# Patient Record
(demographics unavailable — no encounter records)

---

## 2025-02-19 NOTE — PHYSICAL EXAM
[Restricted in physically strenuous activity but ambulatory and able to carry out work of a light or sedentary nature] : Status 1- Restricted in physically strenuous activity but ambulatory and able to carry out work of a light or sedentary nature, e.g., light house work, office work [Normal] : affect appropriate [de-identified] : ring finger deformtity b/l

## 2025-02-19 NOTE — PHYSICAL EXAM
[Restricted in physically strenuous activity but ambulatory and able to carry out work of a light or sedentary nature] : Status 1- Restricted in physically strenuous activity but ambulatory and able to carry out work of a light or sedentary nature, e.g., light house work, office work [Normal] : affect appropriate [de-identified] : ring finger deformtity b/l

## 2025-02-19 NOTE — ASSESSMENT
[FreeTextEntry1] : Elevated FLCR Urine Bence-Carlson, urine immunofixation negative BMP shows normal creatinine Discussed at length about the findings and differential diagnosis including light chain disease Plan Labs today- CBC, CMP, SPEP, IFx, FLCR, flow cytometry Bone marrow in 2-3 weeks. Send myeloma panel and congo red for amyloidosis PET/CT- He had PSMA PET (10/2024) which showed normal bones. Will decide based on findings on labs and bone marrow  Prostate cancer Diagnosed [phone] by Dr Phelps Managed by Dr Fernandez with MRIdian therapy x 5 session Subsequently has been on observation PSMA (2024): SHAHAB. Records not available at this time. Will try to obtain  Social Hx: Lives in [facility] with wife Smoker: former smoker: 20 years, stopped 10-15 years ago: 1 pack a week ETOH - social drinker Illicit drugs - none Work: , teaching. Now retired Genetics: high risk  Screenings: Colonoscopy: none Cologuard: 2025 Prostate;: 2024. Strongly encouraged to pursue colonoscopy    Patient had multiple questions which were answered to satisfaction  FOllow up in a few days for bone marrow CBC

## 2025-02-19 NOTE — ASSESSMENT
[FreeTextEntry1] : Elevated FLCR Urine Bence-Carlson, urine immunofixation negative BMP shows normal creatinine Discussed at length about the findings and differential diagnosis including light chain disease Plan Labs today- CBC, CMP, SPEP, IFx, FLCR, flow cytometry Bone marrow in 2-3 weeks. Send myeloma panel and congo red for amyloidosis PET/CT- He had PSMA PET (10/2024) which showed normal bones. Will decide based on findings on labs and bone marrow  Prostate cancer Diagnosed [id number] by Dr Phelps Managed by Dr Fernandez with MRIdian therapy x 5 session Subsequently has been on observation PSMA (2024): SHAHAB. Records not available at this time. 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 Strongly encouraged to pursue colonoscopy    Patient had multiple questions which were answered to satisfaction  FOllow up in a few days for bone marrow CBC

## 2025-02-19 NOTE — BEGINNING OF VISIT
[0] : 2) Feeling down, depressed, or hopeless: Not at all (0) [PHQ-2 Negative] : PHQ-2 Negative [Pain Scale: ___] : On a scale of 1-10, today the patient's pain is a(n) [unfilled]. [Former] : Former [10-14] : 10-14 [> 15 Years] : > 15 Years [Date Discussed (MM/DD/YY): ___] : Discussed: [unfilled] [Reviewed, no changes] : Reviewed, no changes

## 2025-02-19 NOTE — HISTORY OF PRESENT ILLNESS
[de-identified] : Mr. Raul Montes is 63 years old male with elevated Kappa chain here for consultation referred , cardiologist.   Patient with hx of cardiomyopathy , CAD, GERD, HTN, HLD, hypothyroid, JORDY, and prostate cancer [facility] therapy at [place]   He was a 9/11    10/2024  H/H 13.6/42.1 MCV 89.9  normal Calcium and Bun/Cr  1/31/2025 Kappa - 27.82 Lambda - 1.46  FLCR - 19.05  urine total protein - 20  urine bence ta - neg   He was tested for amyloidosis which he was reported was negative and states that he will possibly undergo a cardiac MRI.   10/1/2024 Pet PSMA 1. No tracer avid lymphadenopathy or distant disease. 2. Nonspecific mild tracer heterogeneity in the prostate.  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

## 2025-02-19 NOTE — PHYSICAL EXAM
[Restricted in physically strenuous activity but ambulatory and able to carry out work of a light or sedentary nature] : Status 1- Restricted in physically strenuous activity but ambulatory and able to carry out work of a light or sedentary nature, e.g., light house work, office work [Normal] : affect appropriate [de-identified] : ring finger deformtity b/l

## 2025-02-19 NOTE — PHYSICAL EXAM
[Restricted in physically strenuous activity but ambulatory and able to carry out work of a light or sedentary nature] : Status 1- Restricted in physically strenuous activity but ambulatory and able to carry out work of a light or sedentary nature, e.g., light house work, office work [Normal] : affect appropriate [de-identified] : ring finger deformtity b/l

## 2025-02-19 NOTE — ASSESSMENT
[FreeTextEntry1] : Elevated FLCR Urine Bence-Carlson, urine immunofixation negative BMP shows normal creatinine Discussed at length about the findings and differential diagnosis including light chain disease Plan Labs today- CBC, CMP, SPEP, IFx, FLCR, flow cytometry Bone marrow in 2-3 weeks. Send myeloma panel and congo red for amyloidosis PET/CT- He had PSMA PET (10/2024) which showed normal bones. Will decide based on findings on labs and bone marrow  Prostate cancer Diagnosed [id number] by Dr Phelps Managed by Dr Fernandez with MRIdian therapy x 5 session Subsequently has been on observation PSMA (2024): SHAHAB. Records not available at this time. 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 Strongly encouraged to pursue colonoscopy    Patient had multiple questions which were answered to satisfaction  FOllow up in a few days for bone marrow CBC

## 2025-02-19 NOTE — HISTORY OF PRESENT ILLNESS
[de-identified] : Mr. Raul Montes is 63 years old male with elevated Kappa chain here for consultation referred , cardiologist.   Patient with hx of cardiomyopathy , CAD, GERD, HTN, HLD, hypothyroid, JORDY, and prostate cancer Merd[facility] therapy at [place]   He was a 9/11    10/2024  H/H 13.6/42.1 MCV 89.9  normal Calcium and Bun/Cr  1/31/2025 Kappa - 27.82 Lambda - 1.46  FLCR - 19.05  urine total protein - 20  urine bence ta - neg   He was tested for amyloidosis which he was reported was negative and states that he will possibly undergo a cardiac MRI.   10/1/2024 Pet PSMA 1. No tracer avid lymphadenopathy or distant disease. 2. Nonspecific mild tracer heterogeneity in the prostate.  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

## 2025-02-19 NOTE — HISTORY OF PRESENT ILLNESS
[de-identified] : Mr. Raul Montes is 63 years old male with elevated Kappa chain here for consultation referred , cardiologist.   Patient with hx of cardiomyopathy , CAD, GERD, HTN, HLD, hypothyroid, JORDY, and prostate cancer Merdian therapy at [place]   He was a 9/11    10/2024  H/H 13.6/42.1 MCV 89.9  normal Calcium and Bun/Cr  1/31/2025 Kappa - 27.82 Lambda - 1.46  FLCR - 19.05  urine total protein - 20  urine bence ta - neg   He was tested for amyloidosis which he was reported was negative and states that he will possibly undergo a cardiac MRI.   10/1/2024 Pet PSMA 1. No tracer avid lymphadenopathy or distant disease. 2. Nonspecific mild tracer heterogeneity in the prostate.  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

## 2025-02-19 NOTE — ASSESSMENT
[FreeTextEntry1] : Elevated FLCR Urine Bence-Carlson, urine immunofixation negative BMP shows normal creatinine Discussed at length about the findings and differential diagnosis including light chain disease Plan Labs today- CBC, CMP, SPEP, IFx, FLCR, flow cytometry Bone marrow in 2-3 weeks. Send myeloma panel and congo red for amyloidosis PET/CT- He had PSMA PET (10/2024) which showed normal bones. Will decide based on findings on labs and bone marrow  Prostate cancer Diagnosed [id number] by Dr Phelps Managed by Dr Fernandez with MRIdian therapy x 5 session Subsequently has been on observation PSMA (2024): SHAHAB. Records not available at this time. 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 Strongly encouraged to pursue colonoscopy    Patient had multiple questions which were answered to satisfaction  FOllow up in a few days for bone marrow CBC

## 2025-02-22 NOTE — ASSESSMENT
[FreeTextEntry1] : 62 yo male with h/o CAD s/p PCI in 2011 (RCA/RPLA with non-obs disease in LAD/D1 and OM1), HTN, HLD, JORDY here for follow up.  #Dyspnea on exertion, recurrent chest pain requiring hospitalization 02/2025- h/o CAD s/p PCI - CCS III --will obtain CCTA given persistent chest pain requiring SL nitro --recent TTE/stress test WNL; although only 5 min of exercise  --remains on ASA and plavix - will continue for now --continue statin --continue current anti-anginal therapies including BB --continued CV risk factor modification discussed  #Abnormal EKG - low voltage with elevated kappa/meaghan ratio --TTR amyloid study negative --will obtain cardiac MRI to assess for cardiac involvement --continue heme/onc follow up and care   # Hypertension -  well controlled today - continue current anti-hypertensives inc coreg, losartan, hctz - home BP monitoring encouraged - continued exercise as tolerated - Low salt diet  # Hyperlipidemia -  Sub-optimally Controlled, Aim for a LDL of <70 - Continue current prescribed medications inc lipitor 80mg --labs pending today - Low fat low cholesterol diet - Heart healthy diet emphasized - Exercise as tolerated  Follow up after above workup

## 2025-02-22 NOTE — ASSESSMENT
[FreeTextEntry1] : 64 yo male with h/o CAD s/p PCI in 2011 (RCA/RPLA with non-obs disease in LAD/D1 and OM1), HTN, HLD, JORDY here for follow up.  #Dyspnea on exertion, recurrent chest pain requiring hospitalization 02/2025- h/o CAD s/p PCI - CCS III --will obtain CCTA given persistent chest pain requiring SL nitro --recent TTE/stress test WNL; although only 5 min of exercise  --remains on ASA and plavix - will continue for now --continue statin --continue current anti-anginal therapies including BB --continued CV risk factor modification discussed  #Abnormal EKG - low voltage with elevated kappa/meaghan ratio --TTR amyloid study negative --will obtain cardiac MRI to assess for cardiac involvement --continue heme/onc follow up and care   # Hypertension -  well controlled today - continue current anti-hypertensives inc coreg, losartan, hctz - home BP monitoring encouraged - continued exercise as tolerated - Low salt diet  # Hyperlipidemia -  Sub-optimally Controlled, Aim for a LDL of <70 - Continue current prescribed medications inc lipitor 80mg --labs pending today - Low fat low cholesterol diet - Heart healthy diet emphasized - Exercise as tolerated  Follow up after above workup

## 2025-02-22 NOTE — HISTORY OF PRESENT ILLNESS
[FreeTextEntry1] : 62 yo male with h/o CAD s/p PCI in 2011 (RCA/RPLA with non-obs disease in LAD/D1 and OM1), HTN, HLD, JORDY here for follow up cardiac care. Since last visit, he was hospitalized at [facility] 2/17/25 for chest pain in the setting of elevated BP. He took SL nitro which helped alleviate the discomfort. Did not have symptoms prior to his PCI. His shortness of breath has resolved since last visit. Denies any palpitations, orthopnea, PND or LE edema.  In the interim, he is also undergoing amyloid workup for low voltage EKG/LVH on echo - found to have elevated kappa/lambda ratio and is now following with heme-onc for possible paraproteinemia. Has a BM biopsy pending  Cardiac Workup: EKG: NSR with inferior infarct and low voltage precordial leads; unchanged from prior in 02/2024 Stress test 2013: 9 min 31 sec (11 METs), no ischemia by EKG or echo. delayed HR recovery. TTE 01/2025: LVEF 60%, diastolic dysfunction, LVH, borderline pulm HTN, no sign valvular disease Stress EKG 01/2025: exercise time 5 min 36 sec (7.1 METS); 106% MPHR achieved PYP scan 01/2025: neg for TTR amyloid kappa/lambda ratio 19

## 2025-02-22 NOTE — HISTORY OF PRESENT ILLNESS
[FreeTextEntry1] : 64 yo male with h/o CAD s/p PCI in 2011 (RCA/RPLA with non-obs disease in LAD/D1 and OM1), HTN, HLD, JORDY here for follow up cardiac care. Since last visit, he was hospitalized at [facility] 2/17/25 for chest pain in the setting of elevated BP. He took SL nitro which helped alleviate the discomfort. Did not have symptoms prior to his PCI. His shortness of breath has resolved since last visit. Denies any palpitations, orthopnea, PND or LE edema.  In the interim, he is also undergoing amyloid workup for low voltage EKG/LVH on echo - found to have elevated kappa/lambda ratio and is now following with heme-onc for possible paraproteinemia. Has a BM biopsy pending  Cardiac Workup: EKG: NSR with inferior infarct and low voltage precordial leads; unchanged from prior in 02/2024 Stress test 2013: 9 min 31 sec (11 METs), no ischemia by EKG or echo. delayed HR recovery. TTE 01/2025: LVEF 60%, diastolic dysfunction, LVH, borderline pulm HTN, no sign valvular disease Stress EKG 01/2025: exercise time 5 min 36 sec (7.1 METS); 106% MPHR achieved PYP scan 01/2025: neg for TTR amyloid kappa/lambda ratio 19

## 2025-03-07 NOTE — PROCEDURE
[Bone Marrow Biopsy] : bone marrow biopsy [Bone Marrow Aspiration] : bone marrow aspiration  [Patient] : the patient [Patient identification verified] : patient identification verified [Procedure verified and consent obtained] : procedure verified and consent obtained [Laterality verified and correct site marked] : laterality verified and correct site marked [Left] : site: left [Correct positioning] : correct positioning [Prone] : prone [Superior iliac spine was identified] : the superior iliac spine was identified. [The left posterior iliac crest was prepped with betadine and draped, using sterile technique.] : The left posterior iliac crest was prepped with betadine and draped, using sterile technique. [Lidocaine was injected and into the periosteum overlying the site.] : Lidocaine was injected and into the periosteum overlying the site. [Aspirate] : aspirate [Cytogenetics] : cytogenetics [FISH] : FISH [Other ___] : [unfilled] [Biopsy] : biopsy [Flow Cytometry] : flow cytometry [] : The patient was instructed to remove the bandage the following AM. The patient may bathe. Acetaminophen may be taken for discomfort, as per package directions.If there are any other problems, the patient was instructed to call the office. The patient verbalized understanding, and is aware of the office contact numbers. [FreeTextEntry1] : Elevated free light chains rule out myeloma, amyloidosis

## 2025-03-07 NOTE — ASSESSMENT
[FreeTextEntry1] : Elevated FLCR Urine Bence-Carlson, urine immunofixation negative BMP shows normal creatinine Labs reviewed analysed and discussed Bone marrow today- tolerated well Plan PET/CT- He had PSMA PET (10/2024) which showed normal bones. Will decide on FDG PET or MRI spine based on findings on labs and bone marrow  Prostate cancer Diagnosed [id number] by Dr Phelps Managed by Dr Fernandez with MRIdian therapy x 5 session Subsequently has been on observation PSMA (2024): SHAHAB. Records not available at this time. 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 Strongly encouraged to pursue colonoscopy    Patient and his wife had multiple questions which were answered to satisfaction  Follow up in 3 weeks  No labs

## 2025-03-07 NOTE — HISTORY OF PRESENT ILLNESS
[de-identified] : Mr. Raul Montes is 63 years old male with elevated Kappa chain here for consultation referred , cardiologist.   Patient with hx of cardiomyopathy , CAD, GERD, HTN, HLD, hypothyroid, JORDY, and prostate cancer Merdian therapy at [place]   He was a 9/11    10/2024  H/H 13.6/42.1 MCV 89.9  normal Calcium and Bun/Cr  1/31/2025 Kappa - 27.82 Lambda - 1.46  FLCR - 19.05  urine total protein - 20  urine bence ta - neg   He was tested for amyloidosis which he was reported was negative and states that he will possibly undergo a cardiac MRI.   10/1/2024 Pet PSMA 1. No tracer avid lymphadenopathy or distant disease. 2. Nonspecific mild tracer heterogeneity in the prostate.  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

## 2025-03-07 NOTE — PHYSICAL EXAM
[Restricted in physically strenuous activity but ambulatory and able to carry out work of a light or sedentary nature] : Status 1- Restricted in physically strenuous activity but ambulatory and able to carry out work of a light or sedentary nature, e.g., light house work, office work [Normal] : affect appropriate [de-identified] : ring finger deformtity b/l 4 = No assist / stand by assistance

## 2025-03-31 NOTE — ASSESSMENT
[FreeTextEntry1] : 62 yo male with h/o CAD s/p PCI in 2011 (RCA/RPLA with non-obs disease in LAD/D1 and OM1), HTN, HLD, JORDY here for follow up.  #Dyspnea on exertion, recurrent chest pain requiring hospitalization 02/2025- h/o CAD s/p PCI - CCS III --CCTA with concern for severe stenosis in the pLAD with noncalcific and calcific plaque, RCA stent present w/ probable sig ISR --recent TTE/stress test WNL; although only 5 min of exercise  --pending cardiac cath scheduled for this Friday 2/28/25 w/ Dr. Hylton  --remains on ASA and Plavix - will continue --continue statin --continue current anti-anginal therapies including BB --continued CV risk factor modification discussed  #Abnormal EKG - low voltage with elevated kappa/meaghan ratio --TTR amyloid study negative --will obtain cardiac MRI to assess for cardiac involvement --continue heme/onc follow up and care  #Hypertension  - continue current anti-hypertensives inc coreg, losartan, hctz - home BP monitoring encouraged - continued exercise as tolerated - Low salt diet  # Hyperlipidemia - LDL 67; Aim for an LDL of <70 --Continue current prescribed medications inc lipitor 80mg --labs pending today --Low fat low cholesterol diet --Heart healthy diet emphasized --Exercise as tolerated  Follow up with Dr. Hylton on 4/10/25

## 2025-03-31 NOTE — HISTORY OF PRESENT ILLNESS
[FreeTextEntry1] : 64 yo male with h/o CAD s/p PCI in 2011 (RCA/RPLA with non-obs disease in LAD/D1 and OM1), HTN, HLD, JORDY here for follow up cardiac care. Since last visit, he was hospitalized at [facility] 2/17/25 for chest pain in the setting of elevated BP. He took SL nitro which helped alleviate the discomfort. Did not have symptoms prior to his PCI. His shortness of breath has resolved since last visit. Denies any palpitations, orthopnea, PND or LE edema.  CCTA with concern for severe stenosis in the prox LAD and RCA stent present w/ probable sig ISR Pending cardiac cath with Dr. Hylton this Friday 3/28/25. Cath labs done today. Discussed CCTA results in detail.  In the interim, he is also undergoing amyloid workup for low voltage EKG/LVH on echo - found to have elevated kappa/lambda ratio and is now following with heme-onc for possible paraproteinemia. Has a BM biopsy pending  Cardiac Workup: EKG: NSR with inferior infarct and low voltage precordial leads; unchanged from prior in 02/2024 Stress test 2013: 9 min 31 sec (11 METs), no ischemia by EKG or echo. delayed HR recovery. TTE 01/2025: LVEF 60%, diastolic dysfunction, LVH, borderline pulm HTN, no sign valvular disease Stress EKG 01/2025: exercise time 5 min 36 sec (7.1 METS); 106% MPHR achieved PYP scan 01/2025: neg for TTR amyloid kappa/lambda ratio 19 CCTA 3/20/25: severe stenosis prox LAD noncalcific and calcific plaque, RCA stent present w/ probable sig ISR cardiac MRI 3/2025: concentric LVH. normal LV function.

## 2025-03-31 NOTE — PHYSICAL EXAM
[Well Developed] : well developed [Well Nourished] : well nourished [No Acute Distress] : no acute distress [No Respiratory Distress] : no respiratory distress  [Normal Gait] : normal gait [No Edema] : no edema [No Cyanosis] : no cyanosis [No Clubbing] : no clubbing [No Varicosities] : no varicosities [No Rash] : no rash [No Skin Lesions] : no skin lesions [Moves all extremities] : moves all extremities [No Focal Deficits] : no focal deficits [Normal Speech] : normal speech [Alert and Oriented] : alert and oriented [Normal memory] : normal memory

## 2025-04-14 NOTE — ASSESSMENT
[FreeTextEntry1] : will need f/u yktn  and staged PCI on April 25th   62 yo male with h/o CAD s/p PCI in 2011 (RCA/RPLA with non-obs disease in LAD/D1 and OM1), HTN, HLD, JORDY here for follow up.  #Dyspnea on exertion, recurrent chest pain requiring hospitalization 02/2025- h/o CAD s/p PCI - CCS III --CCTA with concern for severe stenosis in the pLAD with noncalcific and calcific plaque, RCA stent present w/ probable sig ISR --recent TTE/stress test WNL; although only 5 min of exercise  --pending cardiac cath scheduled for this Friday 2/28/25 w/ Dr. Hylton  --remains on ASA and Plavix - will continue --continue statin --continue current anti-anginal therapies including BB --continued CV risk factor modification discussed  #Abnormal EKG - low voltage with elevated kappa/meaghan ratio --TTR amyloid study negative --will obtain cardiac MRI to assess for cardiac involvement --continue heme/onc follow up and care  #Hypertension  - continue current anti-hypertensives inc coreg, losartan, hctz - home BP monitoring encouraged - continued exercise as tolerated - Low salt diet  # Hyperlipidemia - LDL 67; Aim for an LDL of <70 --Continue current prescribed medications inc lipitor 80mg --labs pending today --Low fat low cholesterol diet --Heart healthy diet emphasized --Exercise as tolerated  Follow up with Dr. Hylton on 4/10/25

## 2025-04-14 NOTE — HISTORY OF PRESENT ILLNESS
[FreeTextEntry1] : 62 yo male with h/o CAD s/p PCI in 2011 (RCA/RPLA with non-obs disease in LAD/D1 and OM1), HTN, HLD, JORDY, MGUS here for follow up cardiac care. Since last visit, he was hospitalized at [facility] 2/17/25 for chest pain in the setting of elevated BP. He took SL nitro which helped alleviate the discomfort. Subsequent CCTA significant for two vessel obstructive disease - RCA ISR and severe prox LAD disease. He underwent PCI to the RCA on 3/28/25 with re-RASHEED. He has residual LAD stenosis with plan for staged PCI.  He returns for follow up. Reports brief episodes of dyspnea that resolve - more pronounced soon after the procedure; now improving. Had HTN episode 2 days ago when he felt "inflammed" - notes chest discomfort at the time with associated dyspnea that resolved with deep breathing and rest after a few minutes. Has not recurred. He has not been exerting himself. 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 No heavy lifting or strenuous exertion Denies any significant palpitations, orthopnea, LE edema, PND or syncope.  Cardiac Workup: EKG: NSR with inferior infarct and low voltage precordial leads; unchanged from prior in 02/2024 Stress test 2013: 9 min 31 sec (11 METs), no ischemia by EKG or echo. delayed HR recovery. TTE 01/2025: LVEF 60%, diastolic dysfunction, LVH, borderline pulm HTN, no sign valvular disease Stress EKG 01/2025: exercise time 5 min 36 sec (7.1 METS); 106% MPHR achieved PYP scan 01/2025: neg for TTR amyloid kappa/lambda ratio 19 CCTA 3/20/25: severe stenosis prox LAD noncalcific and calcific plaque, RCA stent present w/ probable sig ISR cardiac MRI 3/2025: concentric LVH. normal LV function. Cardiac cath 
[FreeTextEntry1] : 62 yo male with h/o CAD s/p PCI in 2011 (RCA/RPLA with non-obs disease in LAD/D1 and OM1), HTN, HLD, JORDY, MGUS here for follow up cardiac care. Since last visit, he was hospitalized at [facility] 2/17/25 for chest pain in the setting of elevated BP. He took SL nitro which helped alleviate the discomfort. Subsequent CCTA significant for two vessel obstructive disease - RCA ISR and severe prox LAD disease. He underwent PCI to the RCA on 3/28/25 with re-RASHEED. He has residual LAD stenosis with plan for staged PCI.  He returns for follow up. Reports brief episodes of dyspnea that resolve - more pronounced soon after the procedure; now improving. Had HTN episode 2 days ago when he felt "inflammed" - notes chest discomfort at the time with associated dyspnea that resolved with deep breathing and rest after a few minutes. Has not recurred. He has not been exerting himself. 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 No heavy lifting or strenuous exertion Denies any significant palpitations, orthopnea, LE edema, PND or syncope.  Cardiac Workup: EKG: NSR with inferior infarct and low voltage precordial leads; unchanged from prior in 02/2024 Stress test 2013: 9 min 31 sec (11 METs), no ischemia by EKG or echo. delayed HR recovery. TTE 01/2025: LVEF 60%, diastolic dysfunction, LVH, borderline pulm HTN, no sign valvular disease Stress EKG 01/2025: exercise time 5 min 36 sec (7.1 METS); 106% MPHR achieved PYP scan 01/2025: neg for TTR amyloid kappa/lambda ratio 19 CCTA 3/20/25: severe stenosis prox LAD noncalcific and calcific plaque, RCA stent present w/ probable sig ISR cardiac MRI 3/2025: concentric LVH. normal LV function. Cardiac cath 
[FreeTextEntry1] : 62 yo male with h/o CAD s/p PCI in 2011 (RCA/RPLA with non-obs disease in LAD/D1 and OM1), HTN, HLD, JORDY, MGUS here for follow up cardiac care. Since last visit, he was hospitalized at [facility] 2/17/25 for chest pain in the setting of elevated BP. He took SL nitro which helped alleviate the discomfort. Subsequent CCTA significant for two vessel obstructive disease - RCA ISR and severe prox LAD disease. He underwent PCI to the RCA on 3/28/25 with re-RASHEED. He has residual LAD stenosis with plan for staged PCI.  He returns for follow up. Reports brief episodes of dyspnea that resolve - more pronounced soon after the procedure; now improving. Had HTN episode 2 days ago when he felt "inflammed" - notes chest discomfort at the time with associated dyspnea that resolved with deep breathing and rest after a few minutes. Has not recurred. He has not been exerting himself. 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 No heavy lifting or strenuous exertion Denies any significant palpitations, orthopnea, LE edema, PND or syncope.  Cardiac Workup: EKG: NSR with inferior infarct and low voltage precordial leads; unchanged from prior in 02/2024 Stress test 2013: 9 min 31 sec (11 METs), no ischemia by EKG or echo. delayed HR recovery. TTE 01/2025: LVEF 60%, diastolic dysfunction, LVH, borderline pulm HTN, no sign valvular disease Stress EKG 01/2025: exercise time 5 min 36 sec (7.1 METS); 106% MPHR achieved PYP scan 01/2025: neg for TTR amyloid kappa/lambda ratio 19 CCTA 3/20/25: severe stenosis prox LAD noncalcific and calcific plaque, RCA stent present w/ probable sig ISR cardiac MRI 3/2025: concentric LVH. normal LV function. Cardiac cath 
[FreeTextEntry1] : 64 yo male with h/o CAD s/p PCI in 2011 (RCA/RPLA with non-obs disease in LAD/D1 and OM1), HTN, HLD, JORDY, MGUS here for follow up cardiac care. Since last visit, he was hospitalized at [facility] 2/17/25 for chest pain in the setting of elevated BP. He took SL nitro which helped alleviate the discomfort. Subsequent CCTA significant for two vessel obstructive disease - RCA ISR and severe prox LAD disease. He underwent PCI to the RCA on 3/28/25 with re-RASHEED. He has residual LAD stenosis with plan for staged PCI.  He returns for follow up. Reports brief episodes of dyspnea that resolve - more pronounced soon after the procedure; now improving. Had HTN episode 2 days ago when he felt "inflammed" - notes chest discomfort at the time with associated dyspnea that resolved with deep breathing and rest after a few minutes. Has not recurred. He has not been exerting himself. 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 No heavy lifting or strenuous exertion Denies any significant palpitations, orthopnea, LE edema, PND or syncope.  Cardiac Workup: EKG: NSR with inferior infarct and low voltage precordial leads; unchanged from prior in 02/2024 Stress test 2013: 9 min 31 sec (11 METs), no ischemia by EKG or echo. delayed HR recovery. TTE 01/2025: LVEF 60%, diastolic dysfunction, LVH, borderline pulm HTN, no sign valvular disease Stress EKG 01/2025: exercise time 5 min 36 sec (7.1 METS); 106% MPHR achieved PYP scan 01/2025: neg for TTR amyloid kappa/lambda ratio 19 CCTA 3/20/25: severe stenosis prox LAD noncalcific and calcific plaque, RCA stent present w/ probable sig ISR cardiac MRI 3/2025: concentric LVH. normal LV function. Cardiac cath 
<<----- Click to add NO significant Past Surgical History

## 2025-04-14 NOTE — ASSESSMENT
[FreeTextEntry1] : will need f/u yktn  and staged PCI on April 25th   64 yo male with h/o CAD s/p PCI in 2011 (RCA/RPLA with non-obs disease in LAD/D1 and OM1), HTN, HLD, JORDY here for follow up.  #Dyspnea on exertion, recurrent chest pain requiring hospitalization 02/2025- h/o CAD s/p PCI - CCS III --CCTA with concern for severe stenosis in the pLAD with noncalcific and calcific plaque, RCA stent present w/ probable sig ISR --recent TTE/stress test WNL; although only 5 min of exercise  --pending cardiac cath scheduled for this Friday 2/28/25 w/ Dr. Hylton  --remains on ASA and Plavix - will continue --continue statin --continue current anti-anginal therapies including BB --continued CV risk factor modification discussed  #Abnormal EKG - low voltage with elevated kappa/meaghan ratio --TTR amyloid study negative --will obtain cardiac MRI to assess for cardiac involvement --continue heme/onc follow up and care  #Hypertension  - continue current anti-hypertensives inc coreg, losartan, hctz - home BP monitoring encouraged - continued exercise as tolerated - Low salt diet  # Hyperlipidemia - LDL 67; Aim for an LDL of <70 --Continue current prescribed medications inc lipitor 80mg --labs pending today --Low fat low cholesterol diet --Heart healthy diet emphasized --Exercise as tolerated  Follow up with Dr. Hylton on 4/10/25

## 2025-05-05 NOTE — PHYSICAL EXAM
[Well Developed] : well developed [Well Nourished] : well nourished [No Acute Distress] : no acute distress [Normal Conjunctiva] : normal conjunctiva [Normal Venous Pressure] : normal venous pressure [No Carotid Bruit] : no carotid bruit [Normal S1, S2] : normal S1, S2 [No Murmur] : no murmur [No Rub] : no rub [No Respiratory Distress] : no respiratory distress  [Normal Gait] : normal gait [No Edema] : no edema [No Cyanosis] : no cyanosis [No Clubbing] : no clubbing [No Varicosities] : no varicosities [No Rash] : no rash [No Skin Lesions] : no skin lesions [Moves all extremities] : moves all extremities [No Focal Deficits] : no focal deficits [Normal Speech] : normal speech [Alert and Oriented] : alert and oriented [Normal memory] : normal memory

## 2025-05-17 NOTE — ASSESSMENT
[FreeTextEntry1] : 62 yo male with h/o CAD s/p PCI in 2011 (RCA/RPLA with non-obs disease in LAD/D1 and OM1), HTN, HLD, JORDY here for follow up.  #ASCVD - CAD s/p PCI of the RCA, LAD - last 5/2/25 --continue DAPT with ASA +ticagrelor --continue high intensity statin --continue BB --continued CV risk factor modification discussed --will benefit from cardiac rehab - post PCI   #Abnormal EKG - low voltage with elevated kappa/meaghan ratio --PYP scan negative for TTR amyloid --MRI - normal LVEF with severe concentric hypertrophy (1.6mm) --to consider genetic testing --continue heme/onc follow up and care for underlying MGUS  #Hypertension - well controlled today - continue current anti-hypertensives inc coreg, losartan, hctz - home BP monitoring encouraged - continued exercise as tolerated - Low salt diet  # Hyperlipidemia - LDL 67; Aim for an LDL of <70 --Continue current prescribed medications inc lipitor 80mg --Low fat low cholesterol diet --Heart healthy diet emphasized --Exercise as tolerated  Follow up in 3 months

## 2025-05-17 NOTE — ASSESSMENT
[FreeTextEntry1] : 64 yo male with h/o CAD s/p PCI in 2011 (RCA/RPLA with non-obs disease in LAD/D1 and OM1), HTN, HLD, JORDY here for follow up.  #ASCVD - CAD s/p PCI of the RCA, LAD - last 5/2/25 --continue DAPT with ASA +ticagrelor --continue high intensity statin --continue BB --continued CV risk factor modification discussed --will benefit from cardiac rehab - post PCI   #Abnormal EKG - low voltage with elevated kappa/meaghan ratio --PYP scan negative for TTR amyloid --MRI - normal LVEF with severe concentric hypertrophy (1.6mm) --to consider genetic testing --continue heme/onc follow up and care for underlying MGUS  #Hypertension - well controlled today - continue current anti-hypertensives inc coreg, losartan, hctz - home BP monitoring encouraged - continued exercise as tolerated - Low salt diet  # Hyperlipidemia - LDL 67; Aim for an LDL of <70 --Continue current prescribed medications inc lipitor 80mg --Low fat low cholesterol diet --Heart healthy diet emphasized --Exercise as tolerated  Follow up in 3 months

## 2025-05-17 NOTE — HISTORY OF PRESENT ILLNESS
[FreeTextEntry1] : 62 yo male with h/o CAD s/p PCI in 2011 (RCA/RPLA with non-obs disease in LAD/D1 and OM1), HTN, HLD, JORDY, MGUS here for follow up cardiac care. He was hospitalized at [facility] 2/17/25 for chest pain in the setting of elevated BP. He took SL nitro which helped alleviate the discomfort. Subsequent CCTA significant for two vessel obstructive disease - RCA ISR and severe prox LAD disease. He underwent PCI to the RCA on 3/28/25 with re-RASHEED. He had residual LAD stenosis and is s/p PCI using RASHEED x 3 to the prox LAD on 5/2/25. Post procedure had chest discomfort while admitted.  Today denies chest pain, palpitations, dyspnea, LE edema, PND or syncope. States that dyspnea on exertion is improving    Cardiac Workup: EKG: NSR with inferior infarct and low voltage precordial leads; unchanged from prior in 02/2024 Stress test 2013: 9 min 31 sec (11 METs), no ischemia by EKG or echo. delayed HR recovery. TTE 01/2025: LVEF 60%, diastolic dysfunction, LVH, borderline pulm HTN, no sign valvular disease Stress EKG 01/2025: exercise time 5 min 36 sec (7.1 METS); 106% MPHR achieved PYP scan 01/2025: neg for TTR amyloid kappa/lambda ratio 19 CCTA 3/20/25: severe stenosis prox LAD noncalcific and calcific plaque, RCA stent present w/ probable sig ISR cardiac MRI 3/2025: concentric LVH. normal LV+RV function. Cardiac cath 3/28/25: Obstructive two-vessel CAD in the proximal RCA (severe ISR 99% stenosis) and proximal LAD (90% diffuse stenosis).  LVEDP 14.  Successful PTCA, shockwave lithotripsy and RASHEED x 1 to the proximal RCA (3.25X 33 mm).  Plan for staged PCI of the LAD in 4 to 5 weeks.  Cardiac cath 5/2/25: Obstructive (90%) stenosis of the prox LAD s/p PTCA and DESx3 (3.5x28, 3.5x18 and 4.0x12mm) with excellent result. LVEDP 9 LM: mild disease; LAD: 90-95% prox LAD stenosis; 60% mid LAD; distal LAD is large without sig obstruc; LCX: mild diffuse OM1; small size; RCA: patent stent prox RCA; mild diffuse disease in the distal RCA 
[FreeTextEntry1] : 64 yo male with h/o CAD s/p PCI in 2011 (RCA/RPLA with non-obs disease in LAD/D1 and OM1), HTN, HLD, JORDY, MGUS here for follow up cardiac care. He was hospitalized at [facility] 2/17/25 for chest pain in the setting of elevated BP. He took SL nitro which helped alleviate the discomfort. Subsequent CCTA significant for two vessel obstructive disease - RCA ISR and severe prox LAD disease. He underwent PCI to the RCA on 3/28/25 with re-RASHEED. He had residual LAD stenosis and is s/p PCI using RASHEED x 3 to the prox LAD on 5/2/25. Post procedure had chest discomfort while admitted.  Today denies chest pain, palpitations, dyspnea, LE edema, PND or syncope. States that dyspnea on exertion is improving    Cardiac Workup: EKG: NSR with inferior infarct and low voltage precordial leads; unchanged from prior in 02/2024 Stress test 2013: 9 min 31 sec (11 METs), no ischemia by EKG or echo. delayed HR recovery. TTE 01/2025: LVEF 60%, diastolic dysfunction, LVH, borderline pulm HTN, no sign valvular disease Stress EKG 01/2025: exercise time 5 min 36 sec (7.1 METS); 106% MPHR achieved PYP scan 01/2025: neg for TTR amyloid kappa/lambda ratio 19 CCTA 3/20/25: severe stenosis prox LAD noncalcific and calcific plaque, RCA stent present w/ probable sig ISR cardiac MRI 3/2025: concentric LVH. normal LV+RV function. Cardiac cath 3/28/25: Obstructive two-vessel CAD in the proximal RCA (severe ISR 99% stenosis) and proximal LAD (90% diffuse stenosis).  LVEDP 14.  Successful PTCA, shockwave lithotripsy and RASHEED x 1 to the proximal RCA (3.25X 33 mm).  Plan for staged PCI of the LAD in 4 to 5 weeks.  Cardiac cath 5/2/25: Obstructive (90%) stenosis of the prox LAD s/p PTCA and DESx3 (3.5x28, 3.5x18 and 4.0x12mm) with excellent result. LVEDP 9 LM: mild disease; LAD: 90-95% prox LAD stenosis; 60% mid LAD; distal LAD is large without sig obstruc; LCX: mild diffuse OM1; small size; RCA: patent stent prox RCA; mild diffuse disease in the distal RCA 
Statement Selected

## 2025-07-02 NOTE — BEGINNING OF VISIT
[0] : 2) Feeling down, depressed, or hopeless: Not at all (0) [PHQ-2 Negative] : PHQ-2 Negative [Former] : Former [> 15 Years] : > 15 Years [Date Discussed (MM/DD/YY): ___] : Discussed: [unfilled] [With Patient/Caregiver] : with Patient/Caregiver [Abdominal Pain] : no abdominal pain [Vomiting] : no vomiting [Constipation] : no constipation

## 2025-07-02 NOTE — ASSESSMENT
[FreeTextEntry1] : ## IgA Kappa  Elevated FLCR Urine Bence-Carlson, urine immunofixation negative BMP shows normal creatinine 3/7/2028 Bone marrow - normal FISH and NGS A,B C. BONE MARROW BIOPSY, CLOT AND ASPIRATE: - Normocellular marrow involved with monotypic plasma cell population (5-6%) - Negative for lymphoma - Congo red is negative for amyloid deposition - Background unremarkable trilineage hematopoiesis  patient is here for follow up  clinically doing well -Labs are drawn in the office, reviewed, analyzed, and discussed normocytic anemia with normal renal function, calcium, and ferritin  Reassured patient that given his normal bone marrow and labs, we would just have to monitor labs from now and his anemia most likely reactive from his other medical conditions/inflammatory  Prostate cancer Diagnosed [id number] by Dr Phelps Managed by Dr Fernandez with MRIdian therapy x 5 session Subsequently has been on observation PSMA (2024): SHAHAB. Records not available at this time. Will try to obtain normal PSA   Social Hx: Lives in [place] with wife Smoker: former smoker: 20 years, stopped 10-15 years ago: 1 pack a week ETOH - social drinker Illicit drugs - none Work: , teaching. Now retired Genetics: high risk  Screenings: Colonoscopy: none Cologuard: 2025 Prostate;: 2024. Strongly encouraged to pursue colonoscopy    Patient and his wife had multiple questions which were answered to satisfaction  d/w Dr. Winters rtc in 6 months  CBC, CMP, Ferritin, PSA, and multiple myeloma

## 2025-07-02 NOTE — ASSESSMENT
[FreeTextEntry1] : ## IgA Kappa  Elevated FLCR Urine Bence-Carlson, urine immunofixation negative BMP shows normal creatinine 3/7/2028 Bone marrow - normal FISH and NGS A,B C. BONE MARROW BIOPSY, CLOT AND ASPIRATE: - Normocellular marrow involved with monotypic plasma cell population (5-6%) - Negative for lymphoma - Congo red is negative for amyloid deposition - Background unremarkable trilineage hematopoiesis  patient is here for follow up  clinically doing well -Labs are drawn in the office, reviewed, analyzed, and discussed normocytic anemia with normal renal function, calcium, and ferritin  Reassured patient that given his normal bone marrow and labs, we would just have to monitor labs from now and his anemia most likely reactive from his other medical conditions/inflammatory  Prostate cancer Diagnosed [id number] by Dr Phelps Managed by Dr Fernandez with MRIdian therapy x 5 session Subsequently has been on observation PSMA (2024): SHAHAB. Records not available at this time. Will try to obtain normal PSA   Social Hx: Lives in [place] 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 Strongly encouraged to pursue colonoscopy    Patient and his wife had multiple questions which were answered to satisfaction  d/w Dr. Winters rtc in 6 months  CBC, CMP, Ferritin, PSA, and multiple myeloma

## 2025-07-02 NOTE — REVIEW OF SYSTEMS
[Chest Pain] : chest pain [Negative] : Allergic/Immunologic [FreeTextEntry5] : intermittent  [FreeTextEntry2] : 10 point review of systems negative except as outlined in HPI

## 2025-07-02 NOTE — PHYSICAL EXAM
[Restricted in physically strenuous activity but ambulatory and able to carry out work of a light or sedentary nature] : Status 1- Restricted in physically strenuous activity but ambulatory and able to carry out work of a light or sedentary nature, e.g., light house work, office work [Normal] : affect appropriate [de-identified] : ring finger deformtity b/l

## 2025-07-02 NOTE — HISTORY OF PRESENT ILLNESS
[de-identified] : Mr. Raul Montes is 63 years old male with elevated Kappa chain here for consultation referred , cardiologist.   Patient with hx of cardiomyopathy , CAD, GERD, HTN, HLD, hypothyroid, JORDY, and prostate cancer (2022  s/p radiation and on Merdian therapy at [place]) He was a 9/11    10/2024  H/H 13.6/42.1 MCV 89.9  normal Calcium and Bun/Cr  1/31/2025 Kappa - 27.82 Lambda - 1.46  FLCR - 19.05  urine total protein - 20  urine bence ta - neg   He was tested for amyloidosis which he was reported was negative and states that he will possibly undergo a cardiac MRI.   10/1/2024 Pet PSMA 1. No tracer avid lymphadenopathy or distant disease. 2. Nonspecific mild tracer heterogeneity in the prostate.  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  01-Aug-2023

## 2025-07-02 NOTE — PHYSICAL EXAM
[Restricted in physically strenuous activity but ambulatory and able to carry out work of a light or sedentary nature] : Status 1- Restricted in physically strenuous activity but ambulatory and able to carry out work of a light or sedentary nature, e.g., light house work, office work [Normal] : affect appropriate [de-identified] : ring finger deformtity b/l

## 2025-07-02 NOTE — PHYSICAL EXAM
[Restricted in physically strenuous activity but ambulatory and able to carry out work of a light or sedentary nature] : Status 1- Restricted in physically strenuous activity but ambulatory and able to carry out work of a light or sedentary nature, e.g., light house work, office work [Normal] : affect appropriate [de-identified] : ring finger deformtity b/l

## 2025-07-02 NOTE — PHYSICAL EXAM
[Restricted in physically strenuous activity but ambulatory and able to carry out work of a light or sedentary nature] : Status 1- Restricted in physically strenuous activity but ambulatory and able to carry out work of a light or sedentary nature, e.g., light house work, office work [Normal] : affect appropriate [de-identified] : ring finger deformtity b/l

## 2025-07-02 NOTE — HISTORY OF PRESENT ILLNESS
[de-identified] : Mr. Raul Montes is 63 years old male with elevated Kappa chain here for consultation referred , cardiologist.   Patient with hx of cardiomyopathy , CAD, GERD, HTN, HLD, hypothyroid, JORDY, and prostate cancer (2022  s/p radiation and on Merdian therapy at [place]) He was a 9/11    10/2024  H/H 13.6/42.1 MCV 89.9  normal Calcium and Bun/Cr  1/31/2025 Kappa - 27.82 Lambda - 1.46  FLCR - 19.05  urine total protein - 20  urine bence ta - neg   He was tested for amyloidosis which he was reported was negative and states that he will possibly undergo a cardiac MRI.   10/1/2024 Pet PSMA 1. No tracer avid lymphadenopathy or distant disease. 2. Nonspecific mild tracer heterogeneity in the prostate.  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

## 2025-07-02 NOTE — PHYSICAL EXAM
[Restricted in physically strenuous activity but ambulatory and able to carry out work of a light or sedentary nature] : Status 1- Restricted in physically strenuous activity but ambulatory and able to carry out work of a light or sedentary nature, e.g., light house work, office work [Normal] : affect appropriate [de-identified] : ring finger deformtity b/l

## 2025-07-02 NOTE — HISTORY OF PRESENT ILLNESS
[de-identified] : Mr. Raul Montes is 63 years old male with elevated Kappa chain here for consultation referred , cardiologist.   Patient with hx of cardiomyopathy , CAD, GERD, HTN, HLD, hypothyroid, JORDY, and prostate cancer (2022  s/p radiation and on Merdian therapy at [place]) He was a 9/11    10/2024  H/H 13.6/42.1 MCV 89.9  normal Calcium and Bun/Cr  1/31/2025 Kappa - 27.82 Lambda - 1.46  FLCR - 19.05  urine total protein - 20  urine bence ta - neg   He was tested for amyloidosis which he was reported was negative and states that he will possibly undergo a cardiac MRI.   10/1/2024 Pet PSMA 1. No tracer avid lymphadenopathy or distant disease. 2. Nonspecific mild tracer heterogeneity in the prostate.  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

## 2025-07-02 NOTE — ASSESSMENT
[FreeTextEntry1] : ## IgA Kappa  Elevated FLCR Urine Bence-Carlson, urine immunofixation negative BMP shows normal creatinine 3/7/2028 Bone marrow - normal FISH and NGS A,B C. BONE MARROW BIOPSY, CLOT AND ASPIRATE: - Normocellular marrow involved with monotypic plasma cell population (5-6%) - Negative for lymphoma - Congo red is negative for amyloid deposition - Background unremarkable trilineage hematopoiesis  patient is here for follow up  clinically doing well -Labs are drawn in the office, reviewed, analyzed, and discussed normocytic anemia with normal renal function, calcium, and ferritin  Reassured patient that given his normal bone marrow and labs, we would just have to monitor labs from now and his anemia most likely reactive from his other medical conditions/inflammatory  Prostate cancer Diagnosed [id number] by Dr Phelps Managed by Dr Fernandez with MRIdian therapy x 5 session Subsequently has been on observation [facility] (2024): [facility]. Records not available at this time. 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 Strongly encouraged to pursue colonoscopy    Patient and his wife had multiple questions which were answered to satisfaction  d/w Dr. Winters rtc in 6 months  CBC, CMP, Ferritin, PSA, and multiple myeloma

## 2025-07-02 NOTE — ASSESSMENT
[FreeTextEntry1] : ## IgA Kappa  Elevated FLCR Urine Bence-Carlson, urine immunofixation negative BMP shows normal creatinine 3/7/2028 Bone marrow - normal FISH and NGS A,B C. BONE MARROW BIOPSY, CLOT AND ASPIRATE: - Normocellular marrow involved with monotypic plasma cell population (5-6%) - Negative for lymphoma - Congo red is negative for amyloid deposition - Background unremarkable trilineage hematopoiesis  patient is here for follow up  clinically doing well -Labs are drawn in the office, reviewed, analyzed, and discussed normocytic anemia with normal renal function, calcium, and ferritin  Reassured patient that given his normal bone marrow and labs, we would just have to monitor labs from now and his anemia most likely reactive from his other medical conditions/inflammatory  Prostate cancer Diagnosed [id number] by Dr Phelps Managed by Dr Fernandez with MRIdian therapy x 5 session Subsequently has been on observation PSMA (2024): [facility]. Records not available at this time. Will try to obtain normal PSA   Social Hx: Lives in [place] with wife Smoker: former smoker: 20 years, stopped 10-15 years ago: 1 pack a week ETOH - social drinker Illicit drugs - none Work: , teaching. Now retired Genetics: high risk  Screenings: Colonoscopy: none Cologuard: 2025 Prostate;: 2024. Strongly encouraged to pursue colonoscopy    Patient and his wife had multiple questions which were answered to satisfaction  d/w Dr. Winters rtc in 6 months  CBC, CMP, Ferritin, PSA, and multiple myeloma

## 2025-07-02 NOTE — HISTORY OF PRESENT ILLNESS
[de-identified] : Mr. Raul Montes is 63 years old male with elevated Kappa chain here for consultation referred , cardiologist.   Patient with hx of cardiomyopathy , CAD, GERD, HTN, HLD, hypothyroid, JORDY, and prostate cancer (2022  s/p radiation and on Merdian therapy at [facility]) He was a 9/11    10/2024  H/H 13.6/42.1 MCV 89.9  normal Calcium and Bun/Cr  1/31/2025 Kappa - 27.82 Lambda - 1.46  FLCR - 19.05  urine total protein - 20  urine bence ta - neg   He was tested for amyloidosis which he was reported was negative and states that he will possibly undergo a cardiac MRI.   10/1/2024 Pet PSMA 1. No tracer avid lymphadenopathy or distant disease. 2. Nonspecific mild tracer heterogeneity in the prostate.  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

## 2025-07-02 NOTE — HISTORY OF PRESENT ILLNESS
clothing [de-identified] : Mr. Raul Montes is 63 years old male with elevated Kappa chain here for consultation referred , cardiologist.   Patient with hx of cardiomyopathy , CAD, GERD, HTN, HLD, hypothyroid, JORDY, and prostate cancer (2022  s/p radiation and on Merdian therapy at [facility]) He was a 9/11    10/2024  H/H 13.6/42.1 MCV 89.9  normal Calcium and Bun/Cr  1/31/2025 Kappa - 27.82 Lambda - 1.46  FLCR - 19.05  urine total protein - 20  urine bence ta - neg   He was tested for amyloidosis which he was reported was negative and states that he will possibly undergo a cardiac MRI.   10/1/2024 Pet PSMA 1. No tracer avid lymphadenopathy or distant disease. 2. Nonspecific mild tracer heterogeneity in the prostate.  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  none

## 2025-07-02 NOTE — ASSESSMENT
[FreeTextEntry1] : ## IgA Kappa  Elevated FLCR Urine Bence-Carlson, urine immunofixation negative BMP shows normal creatinine 3/7/2028 Bone marrow - normal FISH and NGS A,B C. BONE MARROW BIOPSY, CLOT AND ASPIRATE: - Normocellular marrow involved with monotypic plasma cell population (5-6%) - Negative for lymphoma - Congo red is negative for amyloid deposition - Background unremarkable trilineage hematopoiesis  patient is here for follow up  clinically doing well -Labs are drawn in the office, reviewed, analyzed, and discussed normocytic anemia with normal renal function, calcium, and ferritin  Reassured patient that given his normal bone marrow and labs, we would just have to monitor labs from now and his anemia most likely reactive from his other medical conditions/inflammatory  Prostate cancer Diagnosed [id number] by Dr Phelps Managed by Dr Fernandez with MRIdian therapy x 5 session Subsequently has been on observation PSMA (2024): [facility]. Records not available at this time. Will try to obtain normal PSA   Social Hx: Lives in [facility] with wife Smoker: former smoker: 20 years, stopped 10-15 years ago: 1 pack a week ETOH - social drinker Illicit drugs - none Work: , teaching. Now retired Genetics: high risk  Screenings: Colonoscopy: none Cologuard: 2025 Prostate;: 2024. Strongly encouraged to pursue colonoscopy    Patient and his wife had multiple questions which were answered to satisfaction  d/w Dr. Winters rtc in 6 months  CBC, CMP, Ferritin, PSA, and multiple myeloma